# Patient Record
Sex: FEMALE | Race: WHITE | Employment: OTHER | URBAN - METROPOLITAN AREA
[De-identification: names, ages, dates, MRNs, and addresses within clinical notes are randomized per-mention and may not be internally consistent; named-entity substitution may affect disease eponyms.]

---

## 2018-03-14 NOTE — PROCEDURE NOTE: SURGICAL
<p>Prior to commencing surgery patient identification, surgical procedure, site, and side were confirmed by Dr. Yusuf Alcazar. Following topical proparacaine anesthesia, the patient was positioned at the YAG laser, a contact lens coupled to the cornea with methylcellulose and an axial posterior capsulotomy performed without complication using 3.3 Mj x 28. Excess methylcellulose was washed from the eye, one drop of Alphagan was instilled and the patient returned to the holding area having tolerated the procedure well and without complication. </p><p>MRN [id number]</p>

## 2018-03-26 NOTE — PROCEDURE NOTE: SURGICAL
<p>Prior to commencing surgery patient identification, surgical procedure, site, and side were confirmed by Dr. Umer Layton. Following topical proparacaine anesthesia, the patient was positioned at the YAG laser, a contact lens coupled to the cornea with methylcellulose and an axial posterior capsulotomy performed without complication using 3.0 Mj x 24. Excess methylcellulose was washed from the eye, one drop of Alphagan was instilled and the patient returned to the holding area having tolerated the procedure well and without complication. </p><p>MRN [id number]</p>

## 2019-11-15 ENCOUNTER — NEW REFERRAL (OUTPATIENT)
Dept: URBAN - METROPOLITAN AREA CLINIC 33 | Facility: CLINIC | Age: 84
End: 2019-11-15

## 2019-11-15 VITALS
HEIGHT: 64 IN | SYSTOLIC BLOOD PRESSURE: 129 MMHG | DIASTOLIC BLOOD PRESSURE: 83 MMHG | BODY MASS INDEX: 31.07 KG/M2 | WEIGHT: 182 LBS | HEART RATE: 73 BPM

## 2019-11-15 DIAGNOSIS — H43.813: ICD-10-CM

## 2019-11-15 DIAGNOSIS — H35.3231: ICD-10-CM

## 2019-11-15 DIAGNOSIS — H34.8322: ICD-10-CM

## 2019-11-15 DIAGNOSIS — E11.9: ICD-10-CM

## 2019-11-15 DIAGNOSIS — H04.123: ICD-10-CM

## 2019-11-15 DIAGNOSIS — H02.834: ICD-10-CM

## 2019-11-15 DIAGNOSIS — H02.831: ICD-10-CM

## 2019-11-15 DIAGNOSIS — H40.1130: ICD-10-CM

## 2019-11-15 PROCEDURE — 67028 INJECTION EYE DRUG: CPT

## 2019-11-15 PROCEDURE — 99204 OFFICE O/P NEW MOD 45 MIN: CPT

## 2019-11-15 PROCEDURE — 92250 FUNDUS PHOTOGRAPHY W/I&R: CPT

## 2019-11-15 PROCEDURE — 92134 CPTRZ OPH DX IMG PST SGM RTA: CPT

## 2019-11-15 PROCEDURE — 92235 FLUORESCEIN ANGRPH MLTIFRAME: CPT

## 2019-11-15 ASSESSMENT — VISUAL ACUITY
OS_PH: 20/30-2
OD_SC: 20/50-2
OS_CC: 20/40
OS_SC: CF 3FT

## 2019-11-15 ASSESSMENT — TONOMETRY
OS_IOP_MMHG: 14
OD_IOP_MMHG: 12

## 2019-12-20 ENCOUNTER — CLINICAL PROCEDURE AND DIAGNOSTIC TESTING ONLY (OUTPATIENT)
Dept: URBAN - METROPOLITAN AREA CLINIC 33 | Facility: CLINIC | Age: 84
End: 2019-12-20

## 2019-12-20 DIAGNOSIS — H35.3231: ICD-10-CM

## 2019-12-20 PROCEDURE — 92250 FUNDUS PHOTOGRAPHY W/I&R: CPT

## 2019-12-20 PROCEDURE — 67028 INJECTION EYE DRUG: CPT

## 2019-12-20 ASSESSMENT — VISUAL ACUITY: OD_CC: 20/50-1

## 2019-12-20 ASSESSMENT — TONOMETRY: OD_IOP_MMHG: 09

## 2020-01-24 ENCOUNTER — CLINICAL PROCEDURE AND DIAGNOSTIC TESTING ONLY (OUTPATIENT)
Dept: URBAN - METROPOLITAN AREA CLINIC 33 | Facility: CLINIC | Age: 85
End: 2020-01-24

## 2020-01-24 DIAGNOSIS — H35.3231: ICD-10-CM

## 2020-01-24 PROCEDURE — 67028 INJECTION EYE DRUG: CPT

## 2020-01-24 PROCEDURE — 92134 CPTRZ OPH DX IMG PST SGM RTA: CPT

## 2020-01-24 PROCEDURE — 92250 FUNDUS PHOTOGRAPHY W/I&R: CPT

## 2020-01-24 ASSESSMENT — VISUAL ACUITY
OS_CC: 20/30
OD_SC: 20/50-2

## 2020-01-24 ASSESSMENT — TONOMETRY
OS_IOP_MMHG: 15
OD_IOP_MMHG: 17

## 2020-02-28 ENCOUNTER — FOLLOW UP AND POST INJECTION EVALUATION (OUTPATIENT)
Dept: URBAN - METROPOLITAN AREA CLINIC 33 | Facility: CLINIC | Age: 85
End: 2020-02-28

## 2020-02-28 DIAGNOSIS — H35.3231: ICD-10-CM

## 2020-02-28 PROCEDURE — 92250 FUNDUS PHOTOGRAPHY W/I&R: CPT

## 2020-02-28 PROCEDURE — 67028 INJECTION EYE DRUG: CPT

## 2020-02-28 ASSESSMENT — TONOMETRY
OD_IOP_MMHG: 12
OS_IOP_MMHG: 11

## 2020-02-28 ASSESSMENT — VISUAL ACUITY
OD_SC: 20/50+1
OS_SC: CF 6FT

## 2020-04-17 ENCOUNTER — FOLLOW UP AND POST INJECTION EVALUATION (OUTPATIENT)
Dept: URBAN - METROPOLITAN AREA CLINIC 33 | Facility: CLINIC | Age: 85
End: 2020-04-17

## 2020-04-17 VITALS — BODY MASS INDEX: 32.44 KG/M2 | WEIGHT: 190 LBS | HEIGHT: 64 IN

## 2020-04-17 DIAGNOSIS — E11.9: ICD-10-CM

## 2020-04-17 DIAGNOSIS — H40.1130: ICD-10-CM

## 2020-04-17 DIAGNOSIS — H34.8322: ICD-10-CM

## 2020-04-17 DIAGNOSIS — H43.813: ICD-10-CM

## 2020-04-17 DIAGNOSIS — H35.3231: ICD-10-CM

## 2020-04-17 PROCEDURE — 92014 COMPRE OPH EXAM EST PT 1/>: CPT

## 2020-04-17 PROCEDURE — 67028 INJECTION EYE DRUG: CPT

## 2020-04-17 PROCEDURE — 92250 FUNDUS PHOTOGRAPHY W/I&R: CPT

## 2020-04-17 PROCEDURE — 92134 CPTRZ OPH DX IMG PST SGM RTA: CPT

## 2020-04-17 ASSESSMENT — TONOMETRY
OS_IOP_MMHG: 10
OD_IOP_MMHG: 11

## 2020-04-17 ASSESSMENT — VISUAL ACUITY
OD_SC: 20/30
OS_CC: 20/30-1

## 2020-05-29 ENCOUNTER — CLINICAL PROCEDURE AND DIAGNOSTIC TESTING ONLY (OUTPATIENT)
Dept: URBAN - METROPOLITAN AREA CLINIC 33 | Facility: CLINIC | Age: 85
End: 2020-05-29

## 2020-05-29 DIAGNOSIS — H35.3231: ICD-10-CM

## 2020-05-29 DIAGNOSIS — H34.8322: ICD-10-CM

## 2020-05-29 PROCEDURE — 67028 INJECTION EYE DRUG: CPT

## 2020-05-29 PROCEDURE — 92250 FUNDUS PHOTOGRAPHY W/I&R: CPT

## 2020-05-29 PROCEDURE — 92134 CPTRZ OPH DX IMG PST SGM RTA: CPT

## 2020-05-29 ASSESSMENT — TONOMETRY
OS_IOP_MMHG: 10
OD_IOP_MMHG: 11

## 2020-05-29 ASSESSMENT — VISUAL ACUITY
OS_SC: CF 2FT
OD_SC: 20/30-2

## 2020-07-02 ENCOUNTER — CLINICAL PROCEDURE AND DIAGNOSTIC TESTING ONLY (OUTPATIENT)
Dept: URBAN - METROPOLITAN AREA CLINIC 33 | Facility: CLINIC | Age: 85
End: 2020-07-02

## 2020-07-02 DIAGNOSIS — H35.3231: ICD-10-CM

## 2020-07-02 DIAGNOSIS — H34.8322: ICD-10-CM

## 2020-07-02 PROCEDURE — J0178* EYLEA

## 2020-07-02 PROCEDURE — 67028 INJECTION EYE DRUG: CPT

## 2020-07-02 PROCEDURE — 92250 FUNDUS PHOTOGRAPHY W/I&R: CPT

## 2020-07-02 PROCEDURE — 92134 CPTRZ OPH DX IMG PST SGM RTA: CPT

## 2020-07-02 ASSESSMENT — TONOMETRY
OS_IOP_MMHG: 10
OD_IOP_MMHG: 08

## 2020-07-02 ASSESSMENT — VISUAL ACUITY
OD_SC: 20/40+2
OS_SC: CF 2FT

## 2020-08-07 ENCOUNTER — CLINICAL PROCEDURE AND DIAGNOSTIC TESTING ONLY (OUTPATIENT)
Dept: URBAN - METROPOLITAN AREA CLINIC 33 | Facility: CLINIC | Age: 85
End: 2020-08-07

## 2020-08-07 DIAGNOSIS — H35.3231: ICD-10-CM

## 2020-08-07 PROCEDURE — 92134 CPTRZ OPH DX IMG PST SGM RTA: CPT

## 2020-08-07 PROCEDURE — 67028 INJECTION EYE DRUG: CPT

## 2020-08-07 PROCEDURE — J0178* EYLEA

## 2020-08-07 PROCEDURE — 92250 FUNDUS PHOTOGRAPHY W/I&R: CPT

## 2020-08-07 ASSESSMENT — VISUAL ACUITY
OD_SC: 20/30
OS_SC: 20/400-1

## 2020-08-07 ASSESSMENT — TONOMETRY
OD_IOP_MMHG: 13
OS_IOP_MMHG: 12

## 2020-09-11 ENCOUNTER — FOLLOW UP AND POST INJECTION EVALUATION (OUTPATIENT)
Dept: URBAN - METROPOLITAN AREA CLINIC 33 | Facility: CLINIC | Age: 85
End: 2020-09-11

## 2020-09-11 DIAGNOSIS — H02.834: ICD-10-CM

## 2020-09-11 DIAGNOSIS — H34.8322: ICD-10-CM

## 2020-09-11 DIAGNOSIS — H35.3231: ICD-10-CM

## 2020-09-11 DIAGNOSIS — H02.831: ICD-10-CM

## 2020-09-11 DIAGNOSIS — H04.123: ICD-10-CM

## 2020-09-11 DIAGNOSIS — H43.813: ICD-10-CM

## 2020-09-11 DIAGNOSIS — H40.1130: ICD-10-CM

## 2020-09-11 DIAGNOSIS — E11.9: ICD-10-CM

## 2020-09-11 PROCEDURE — 92250 FUNDUS PHOTOGRAPHY W/I&R: CPT

## 2020-09-11 PROCEDURE — J0178* EYLEA

## 2020-09-11 PROCEDURE — 92014 COMPRE OPH EXAM EST PT 1/>: CPT

## 2020-09-11 PROCEDURE — 92134 CPTRZ OPH DX IMG PST SGM RTA: CPT

## 2020-09-11 PROCEDURE — 67028 INJECTION EYE DRUG: CPT

## 2020-09-11 ASSESSMENT — VISUAL ACUITY
OS_SC: CF 2FT
OD_SC: 20/30-1

## 2020-09-11 ASSESSMENT — TONOMETRY
OS_IOP_MMHG: 09
OD_IOP_MMHG: 10

## 2020-10-23 ENCOUNTER — CLINICAL PROCEDURE AND DIAGNOSTIC TESTING ONLY (OUTPATIENT)
Dept: URBAN - METROPOLITAN AREA CLINIC 33 | Facility: CLINIC | Age: 85
End: 2020-10-23

## 2020-10-23 DIAGNOSIS — H35.3231: ICD-10-CM

## 2020-10-23 PROCEDURE — 92134 CPTRZ OPH DX IMG PST SGM RTA: CPT

## 2020-10-23 PROCEDURE — 92250 FUNDUS PHOTOGRAPHY W/I&R: CPT

## 2020-10-23 PROCEDURE — J0178* EYLEA

## 2020-10-23 PROCEDURE — 67028 INJECTION EYE DRUG: CPT

## 2020-10-23 ASSESSMENT — TONOMETRY
OD_IOP_MMHG: 11
OS_IOP_MMHG: 12

## 2020-10-23 ASSESSMENT — VISUAL ACUITY
OS_SC: CF 2FT
OD_SC: 20/30+2

## 2020-12-04 ENCOUNTER — CLINICAL PROCEDURE AND DIAGNOSTIC TESTING ONLY (OUTPATIENT)
Dept: URBAN - METROPOLITAN AREA CLINIC 33 | Facility: CLINIC | Age: 85
End: 2020-12-04

## 2020-12-04 DIAGNOSIS — H35.3231: ICD-10-CM

## 2020-12-04 PROCEDURE — J0178* EYLEA

## 2020-12-04 PROCEDURE — 92134 CPTRZ OPH DX IMG PST SGM RTA: CPT

## 2020-12-04 PROCEDURE — 92250 FUNDUS PHOTOGRAPHY W/I&R: CPT

## 2020-12-04 PROCEDURE — 67028 INJECTION EYE DRUG: CPT

## 2020-12-04 ASSESSMENT — VISUAL ACUITY
OD_CC: 20/30+2
OS_CC: 20/40-2

## 2020-12-04 ASSESSMENT — TONOMETRY
OS_IOP_MMHG: 17
OD_IOP_MMHG: 13

## 2021-01-08 ENCOUNTER — CLINICAL PROCEDURE AND DIAGNOSTIC TESTING ONLY (OUTPATIENT)
Dept: URBAN - METROPOLITAN AREA CLINIC 33 | Facility: CLINIC | Age: 86
End: 2021-01-08

## 2021-01-08 DIAGNOSIS — H35.3231: ICD-10-CM

## 2021-01-08 PROCEDURE — 92250 FUNDUS PHOTOGRAPHY W/I&R: CPT

## 2021-01-08 PROCEDURE — 67028 INJECTION EYE DRUG: CPT

## 2021-01-08 PROCEDURE — J0178* EYLEA

## 2021-01-08 ASSESSMENT — TONOMETRY
OS_IOP_MMHG: 15
OD_IOP_MMHG: 14

## 2021-01-08 ASSESSMENT — VISUAL ACUITY
OD_SC: 20/25-2
OS_SC: 20/400

## 2021-02-12 ENCOUNTER — CLINICAL PROCEDURE AND DIAGNOSTIC TESTING ONLY (OUTPATIENT)
Dept: URBAN - METROPOLITAN AREA CLINIC 33 | Facility: CLINIC | Age: 86
End: 2021-02-12

## 2021-02-12 DIAGNOSIS — H35.3231: ICD-10-CM

## 2021-02-12 DIAGNOSIS — H34.8322: ICD-10-CM

## 2021-02-12 PROCEDURE — 67028 INJECTION EYE DRUG: CPT

## 2021-02-12 PROCEDURE — 92250 FUNDUS PHOTOGRAPHY W/I&R: CPT

## 2021-02-12 PROCEDURE — J0178* EYLEA

## 2021-02-12 PROCEDURE — 92134 CPTRZ OPH DX IMG PST SGM RTA: CPT

## 2021-02-12 ASSESSMENT — TONOMETRY
OS_IOP_MMHG: 14
OD_IOP_MMHG: 12

## 2021-02-12 ASSESSMENT — VISUAL ACUITY
OS_SC: 20/400+2
OD_SC: 20/30-2

## 2021-03-19 ENCOUNTER — FOLLOW UP AND POST INJECTION EVALUATION (OUTPATIENT)
Dept: URBAN - METROPOLITAN AREA CLINIC 33 | Facility: CLINIC | Age: 86
End: 2021-03-19

## 2021-03-19 VITALS — BODY MASS INDEX: 34.15 KG/M2 | WEIGHT: 200 LBS | HEIGHT: 64 IN

## 2021-03-19 DIAGNOSIS — H40.1130: ICD-10-CM

## 2021-03-19 DIAGNOSIS — E11.9: ICD-10-CM

## 2021-03-19 DIAGNOSIS — H35.3231: ICD-10-CM

## 2021-03-19 DIAGNOSIS — H34.8322: ICD-10-CM

## 2021-03-19 DIAGNOSIS — H43.813: ICD-10-CM

## 2021-03-19 PROCEDURE — J0178* EYLEA

## 2021-03-19 PROCEDURE — 67028 INJECTION EYE DRUG: CPT

## 2021-03-19 PROCEDURE — 92134 CPTRZ OPH DX IMG PST SGM RTA: CPT

## 2021-03-19 PROCEDURE — 92014 COMPRE OPH EXAM EST PT 1/>: CPT

## 2021-03-19 PROCEDURE — 92250 FUNDUS PHOTOGRAPHY W/I&R: CPT

## 2021-03-19 ASSESSMENT — VISUAL ACUITY
OD_SC: 20/30-2
OS_SC: 20/400

## 2021-03-19 ASSESSMENT — TONOMETRY
OS_IOP_MMHG: 15
OD_IOP_MMHG: 13

## 2021-04-23 ENCOUNTER — CLINICAL PROCEDURE AND DIAGNOSTIC TESTING ONLY (OUTPATIENT)
Dept: URBAN - METROPOLITAN AREA CLINIC 33 | Facility: CLINIC | Age: 86
End: 2021-04-23

## 2021-04-23 DIAGNOSIS — H34.8322: ICD-10-CM

## 2021-04-23 DIAGNOSIS — H35.3231: ICD-10-CM

## 2021-04-23 PROCEDURE — 92134 CPTRZ OPH DX IMG PST SGM RTA: CPT

## 2021-04-23 PROCEDURE — 92250 FUNDUS PHOTOGRAPHY W/I&R: CPT

## 2021-04-23 PROCEDURE — 67028 INJECTION EYE DRUG: CPT

## 2021-04-23 PROCEDURE — J0178* EYLEA

## 2021-04-23 ASSESSMENT — VISUAL ACUITY
OS_SC: 20/400
OD_SC: 20/25+1

## 2021-04-23 ASSESSMENT — TONOMETRY
OD_IOP_MMHG: 14
OS_IOP_MMHG: 14

## 2021-05-28 ENCOUNTER — CLINICAL PROCEDURE AND DIAGNOSTIC TESTING ONLY (OUTPATIENT)
Dept: URBAN - METROPOLITAN AREA CLINIC 33 | Facility: CLINIC | Age: 86
End: 2021-05-28

## 2021-05-28 VITALS — HEIGHT: 55 IN

## 2021-05-28 DIAGNOSIS — H35.3231: ICD-10-CM

## 2021-05-28 PROCEDURE — 92134 CPTRZ OPH DX IMG PST SGM RTA: CPT

## 2021-05-28 PROCEDURE — 67028 INJECTION EYE DRUG: CPT

## 2021-05-28 PROCEDURE — 92250 FUNDUS PHOTOGRAPHY W/I&R: CPT

## 2021-05-28 ASSESSMENT — VISUAL ACUITY
OD_SC: 20/25-2
OS_SC: 20/400-1

## 2021-05-28 ASSESSMENT — TONOMETRY
OS_IOP_MMHG: 14
OD_IOP_MMHG: 15

## 2021-09-24 ENCOUNTER — FOLLOW UP (OUTPATIENT)
Dept: URBAN - METROPOLITAN AREA CLINIC 33 | Facility: CLINIC | Age: 86
End: 2021-09-24

## 2021-09-24 DIAGNOSIS — H04.123: ICD-10-CM

## 2021-09-24 DIAGNOSIS — H35.3211: ICD-10-CM

## 2021-09-24 DIAGNOSIS — H35.3221: ICD-10-CM

## 2021-09-24 DIAGNOSIS — H34.8322: ICD-10-CM

## 2021-09-24 DIAGNOSIS — H40.1130: ICD-10-CM

## 2021-09-24 DIAGNOSIS — H43.813: ICD-10-CM

## 2021-09-24 DIAGNOSIS — E11.9: ICD-10-CM

## 2021-09-24 PROCEDURE — 92014 COMPRE OPH EXAM EST PT 1/>: CPT

## 2021-09-24 PROCEDURE — J017850 AFLIBERCEPT (EYLEA) 1MG BILATERAL

## 2021-09-24 PROCEDURE — 92134 CPTRZ OPH DX IMG PST SGM RTA: CPT

## 2021-09-24 PROCEDURE — 92250 FUNDUS PHOTOGRAPHY W/I&R: CPT

## 2021-09-24 PROCEDURE — 67028 INJECTION EYE DRUG: CPT

## 2021-09-24 ASSESSMENT — TONOMETRY
OD_IOP_MMHG: 15
OS_IOP_MMHG: 16

## 2021-09-24 ASSESSMENT — VISUAL ACUITY
OS_SC: 20/200
OD_SC: 20/25+2
OS_PH: 20/60-2

## 2021-10-29 ENCOUNTER — CLINICAL PROCEDURE AND DIAGNOSTIC TESTING ONLY (OUTPATIENT)
Dept: URBAN - METROPOLITAN AREA CLINIC 33 | Facility: CLINIC | Age: 86
End: 2021-10-29

## 2021-10-29 DIAGNOSIS — H35.3211: ICD-10-CM

## 2021-10-29 DIAGNOSIS — H35.3221: ICD-10-CM

## 2021-10-29 PROCEDURE — 92134 CPTRZ OPH DX IMG PST SGM RTA: CPT

## 2021-10-29 PROCEDURE — J017850 AFLIBERCEPT (EYLEA) 1MG BILATERAL

## 2021-10-29 PROCEDURE — 92250 FUNDUS PHOTOGRAPHY W/I&R: CPT

## 2021-10-29 PROCEDURE — 67028 INJECTION EYE DRUG: CPT

## 2021-10-29 ASSESSMENT — TONOMETRY
OS_IOP_MMHG: 15
OD_IOP_MMHG: 13

## 2021-10-29 ASSESSMENT — VISUAL ACUITY
OS_CC: 20/80-2
OS_SC: 20/200-2
OD_SC: 20/25-1

## 2021-12-13 ENCOUNTER — CLINIC PROCEDURE ONLY (OUTPATIENT)
Dept: URBAN - METROPOLITAN AREA CLINIC 26 | Facility: CLINIC | Age: 86
End: 2021-12-13

## 2021-12-13 DIAGNOSIS — H35.3211: ICD-10-CM

## 2021-12-13 DIAGNOSIS — H35.3221: ICD-10-CM

## 2021-12-13 PROCEDURE — 67028 INJECTION EYE DRUG: CPT

## 2021-12-13 PROCEDURE — 92250 FUNDUS PHOTOGRAPHY W/I&R: CPT

## 2021-12-13 PROCEDURE — 92134 CPTRZ OPH DX IMG PST SGM RTA: CPT

## 2021-12-13 PROCEDURE — J017850 AFLIBERCEPT (EYLEA) 1MG BILATERAL

## 2021-12-13 ASSESSMENT — TONOMETRY
OS_IOP_MMHG: 19
OD_IOP_MMHG: 15

## 2021-12-13 ASSESSMENT — VISUAL ACUITY
OD_SC: 20/30-2
OS_SC: 20/400

## 2022-01-20 ENCOUNTER — CLINIC PROCEDURE ONLY (OUTPATIENT)
Dept: URBAN - METROPOLITAN AREA CLINIC 33 | Facility: CLINIC | Age: 87
End: 2022-01-20

## 2022-01-20 DIAGNOSIS — H35.3211: ICD-10-CM

## 2022-01-20 DIAGNOSIS — H35.3221: ICD-10-CM

## 2022-01-20 PROCEDURE — 92250 FUNDUS PHOTOGRAPHY W/I&R: CPT

## 2022-01-20 PROCEDURE — 92134 CPTRZ OPH DX IMG PST SGM RTA: CPT

## 2022-01-20 PROCEDURE — J017850 AFLIBERCEPT (EYLEA) 1MG BILATERAL

## 2022-01-20 PROCEDURE — 67028 INJECTION EYE DRUG: CPT

## 2022-01-20 ASSESSMENT — TONOMETRY
OS_IOP_MMHG: 13
OD_IOP_MMHG: 13

## 2022-01-20 ASSESSMENT — VISUAL ACUITY
OS_SC: 20/400
OD_SC: 20/40+1

## 2022-03-07 ENCOUNTER — CLINIC PROCEDURE ONLY (OUTPATIENT)
Dept: URBAN - METROPOLITAN AREA CLINIC 26 | Facility: CLINIC | Age: 87
End: 2022-03-07

## 2022-03-07 DIAGNOSIS — H35.3221: ICD-10-CM

## 2022-03-07 DIAGNOSIS — H35.3211: ICD-10-CM

## 2022-03-07 PROCEDURE — 92134 CPTRZ OPH DX IMG PST SGM RTA: CPT

## 2022-03-07 PROCEDURE — 67028 INJECTION EYE DRUG: CPT

## 2022-03-07 PROCEDURE — 92250 FUNDUS PHOTOGRAPHY W/I&R: CPT

## 2022-03-07 ASSESSMENT — TONOMETRY
OS_IOP_MMHG: 13
OD_IOP_MMHG: 14

## 2022-04-21 ENCOUNTER — CLINIC PROCEDURE ONLY (OUTPATIENT)
Dept: URBAN - METROPOLITAN AREA CLINIC 33 | Facility: CLINIC | Age: 87
End: 2022-04-21

## 2022-04-21 DIAGNOSIS — H35.3231: ICD-10-CM

## 2022-04-21 PROCEDURE — J017850 AFLIBERCEPT (EYLEA) 1MG BILATERAL

## 2022-04-21 PROCEDURE — 92134 CPTRZ OPH DX IMG PST SGM RTA: CPT

## 2022-04-21 PROCEDURE — 67028 INJECTION EYE DRUG: CPT

## 2022-04-21 PROCEDURE — 92250 FUNDUS PHOTOGRAPHY W/I&R: CPT

## 2022-04-21 ASSESSMENT — TONOMETRY
OD_IOP_MMHG: 15
OS_IOP_MMHG: 14

## 2022-05-26 ENCOUNTER — CLINIC PROCEDURE ONLY (OUTPATIENT)
Dept: URBAN - METROPOLITAN AREA CLINIC 33 | Facility: CLINIC | Age: 87
End: 2022-05-26

## 2022-05-26 DIAGNOSIS — H43.813: ICD-10-CM

## 2022-05-26 DIAGNOSIS — H04.123: ICD-10-CM

## 2022-05-26 DIAGNOSIS — E11.9: ICD-10-CM

## 2022-05-26 DIAGNOSIS — H34.8322: ICD-10-CM

## 2022-05-26 DIAGNOSIS — H35.3231: ICD-10-CM

## 2022-05-26 DIAGNOSIS — H40.1130: ICD-10-CM

## 2022-05-26 PROCEDURE — 67028 INJECTION EYE DRUG: CPT

## 2022-05-26 PROCEDURE — 92250 FUNDUS PHOTOGRAPHY W/I&R: CPT

## 2022-05-26 PROCEDURE — 92134 CPTRZ OPH DX IMG PST SGM RTA: CPT

## 2022-05-26 PROCEDURE — J017850 AFLIBERCEPT (EYLEA) 1MG BILATERAL

## 2022-05-26 PROCEDURE — 92012 INTRM OPH EXAM EST PATIENT: CPT

## 2022-05-26 ASSESSMENT — TONOMETRY
OS_IOP_MMHG: 16
OD_IOP_MMHG: 20

## 2022-06-04 ENCOUNTER — TELEPHONE ENCOUNTER (OUTPATIENT)
Dept: URBAN - METROPOLITAN AREA CLINIC 68 | Facility: CLINIC | Age: 87
End: 2022-06-04

## 2022-06-04 RX ORDER — PIOGLITAZONE HCL 15 MG
TABLET ORAL
OUTPATIENT
Start: 2007-05-22 | End: 2011-11-03

## 2022-06-04 RX ORDER — UBIDECARENONE/VIT E ACET 100MG-5
COQ10( 100MG ORAL  DAILY ) INACTIVE -HX ENTRY CAPSULE ORAL DAILY
OUTPATIENT
Start: 2015-05-06

## 2022-06-04 RX ORDER — CIPROFLOXACIN HYDROCHLORIDE 500 MG/1
CIPRO( 500MG ORAL  TWICE DAILY ) INACTIVE -HX ENTRY TABLET, FILM COATED ORAL TWICE DAILY
OUTPATIENT
Start: 2019-03-11

## 2022-06-04 RX ORDER — METFORMIN HYDROCHLORIDE 1000 MG/1
METFORMIN HCL( 1000MG ORAL 1 TWICE DAILY ) INACTIVE -HX ENTRY TABLET, COATED ORAL TWICE DAILY
OUTPATIENT
Start: 2015-05-06

## 2022-06-04 RX ORDER — MULTIVITAMIN
MULTIPLE VITAMIN(  ORAL  DAILY ) INACTIVE -HX ENTRY TABLET ORAL DAILY
OUTPATIENT
Start: 2015-05-06

## 2022-06-04 RX ORDER — VALSARTAN 160 MG/1
DIOVAN( 160MG ORAL 1 DAILY ) INACTIVE -HX ENTRY TABLET ORAL DAILY
OUTPATIENT
Start: 2015-05-06

## 2022-06-04 RX ORDER — AMLODIPINE BESYLATE 5 MG/1
AMLODIPINE BESYLATE( 5MG ORAL 1 DAILY ) INACTIVE -HX ENTRY TABLET ORAL DAILY
OUTPATIENT
Start: 2015-05-06

## 2022-06-04 RX ORDER — BUPROPION HCL 150 MG
TABLET, EXTENDED RELEASE 24 HR ORAL
OUTPATIENT
Start: 2007-05-22 | End: 2011-11-03

## 2022-06-04 RX ORDER — PROPAFENONE HYDROCHLORIDE 150 MG/1
PROPAFENONE HCL( 150MG ORAL  EVERY 12 HOURS ) INACTIVE -HX ENTRY TABLET, FILM COATED ORAL EVERY 12 HOURS
OUTPATIENT
Start: 2019-03-11

## 2022-06-04 RX ORDER — LOSARTAN POTASSIUM 50 MG/1
TABLET, FILM COATED ORAL
OUTPATIENT
Start: 2007-05-22 | End: 2011-11-03

## 2022-06-04 RX ORDER — SIMVASTATIN 40 MG/1
SIMVASTATIN( 40MG ORAL 1 AT BEDTIME ) INACTIVE -HX ENTRY TABLET, FILM COATED ORAL AT BEDTIME
OUTPATIENT
Start: 2015-05-06

## 2022-06-04 RX ORDER — RIVAROXABAN 15 MG/1
XARELTO( 15MG ORAL  DAILY ) INACTIVE -HX ENTRY TABLET, FILM COATED ORAL DAILY
OUTPATIENT
Start: 2016-01-04

## 2022-06-04 RX ORDER — TRIAMTERENE AND HYDROCHLOROTHIAZIDE 37.5; 25 MG/1; MG/1
TABLET ORAL
OUTPATIENT
Start: 2007-05-22 | End: 2011-11-03

## 2022-06-04 RX ORDER — BENZONATATE 100 MG/1
BENZONATATE( 100MG ORAL  3 A DAY PRN ) INACTIVE -HX ENTRY CAPSULE ORAL
OUTPATIENT
Start: 2019-03-11

## 2022-06-04 RX ORDER — PANTOPRAZOLE SODIUM 40 MG/1
PANTOPRAZOLE SODIUM( 40MG ORAL  2 A DAY ) INACTIVE -HX ENTRY TABLET, DELAYED RELEASE ORAL
OUTPATIENT
Start: 2019-03-11

## 2022-06-04 RX ORDER — OMEPRAZOLE 20 MG/1
CAPSULE, DELAYED RELEASE ORAL
Qty: 30 | Refills: 30 | OUTPATIENT
Start: 2012-06-26 | End: 2015-05-06

## 2022-06-04 RX ORDER — PANTOPRAZOLE SODIUM 40 MG/1
TABLET, DELAYED RELEASE ORAL
Qty: 180 | Refills: 180 | OUTPATIENT
Start: 2015-05-06 | End: 2019-03-11

## 2022-06-04 RX ORDER — LEVOTHYROXINE SODIUM 0.07 MG/1
LEVOTHYROXINE SODIUM( 75MCG ORAL 1 DAILY ) INACTIVE -HX ENTRY TABLET ORAL DAILY
OUTPATIENT
Start: 2015-05-06

## 2022-06-04 RX ORDER — BIOTIN 5 MG
KRILL OIL( 1000MG ORAL  DAILY ) INACTIVE -HX ENTRY TABLET ORAL DAILY
OUTPATIENT
Start: 2015-05-06

## 2022-06-04 RX ORDER — POLYETHYLENE GLYCOL 3350, SODIUM SULFATE, SODIUM CHLORIDE, POTASSIUM CHLORIDE, ASCORBIC ACID, SODIUM ASCORBATE 7.5-2.691G
KIT ORAL AS DIRECTED
Qty: 1 | Refills: 0 | OUTPATIENT
Start: 2016-01-04 | End: 2016-03-03

## 2022-06-04 RX ORDER — PREDNISONE 10 MG/1
TABLET ORAL
Qty: 7 | Refills: 7 | OUTPATIENT
Start: 2012-05-22 | End: 2015-05-06

## 2022-06-04 RX ORDER — GLIMEPIRIDE 1 MG/1
GLIMEPIRIDE( 1MG ORAL  DAILY ) INACTIVE -HX ENTRY TABLET ORAL DAILY
OUTPATIENT
Start: 2019-03-11

## 2022-06-04 RX ORDER — EXEMESTANE 25 MG
AROMASIN( 25MG ORAL 1 DAILY ) INACTIVE -HX ENTRY TABLET ORAL DAILY
OUTPATIENT
Start: 2015-05-06

## 2022-06-04 RX ORDER — METOPROLOL TARTRATE 25 MG/1
TABLET, FILM COATED ORAL
OUTPATIENT
Start: 2007-05-22 | End: 2011-11-03

## 2022-06-04 RX ORDER — EXEMESTANE 25 MG
TABLET ORAL
OUTPATIENT
Start: 2007-05-22 | End: 2011-11-03

## 2022-06-04 RX ORDER — DIGOXIN 0.12 MG/1
DIGITEK( 0.125MG ORAL  DAILY ) INACTIVE -HX ENTRY TABLET ORAL DAILY
OUTPATIENT
Start: 2019-03-11

## 2022-06-04 RX ORDER — ASPIRIN 81 MG/1
ASPIRIN( 81MG ORAL  DAILY ) INACTIVE -HX ENTRY TABLET, CHEWABLE ORAL DAILY
OUTPATIENT
Start: 2015-05-06

## 2022-06-05 ENCOUNTER — TELEPHONE ENCOUNTER (OUTPATIENT)
Dept: URBAN - METROPOLITAN AREA CLINIC 68 | Facility: CLINIC | Age: 87
End: 2022-06-05

## 2022-06-05 RX ORDER — FUROSEMIDE 40 MG/1
FUROSEMIDE( 40MG ORAL  DAILY ) ACTIVE -HX ENTRY TABLET ORAL DAILY
Status: ACTIVE | COMMUNITY
Start: 2019-03-11

## 2022-06-05 RX ORDER — DRONEDARONE 400 MG/1
MULTAQ( 400MG ORAL 2 DAILY ) ACTIVE -HX ENTRY TABLET, FILM COATED ORAL DAILY
Status: ACTIVE | COMMUNITY
Start: 2019-03-11

## 2022-06-05 RX ORDER — OMEPRAZOLE 40 MG/1
OMEPRAZOLE( 40MG ORAL 1 DAILY ) ACTIVE -HX ENTRY CAPSULE, DELAYED RELEASE PELLETS ORAL DAILY
Status: ACTIVE | COMMUNITY
Start: 2019-03-11

## 2022-06-05 RX ORDER — METOPROLOL SUCCINATE 100 MG/1
METOPROLOL SUCCINATE ER( 100MG ORAL 1 DAILY ) ACTIVE -HX ENTRY TABLET, EXTENDED RELEASE ORAL DAILY
Status: ACTIVE | COMMUNITY
Start: 2019-03-11

## 2022-06-05 RX ORDER — WARFARIN 3 MG/1
WARFARIN SODIUM( 3MG ORAL  AS DIRECTED ) ACTIVE -HX ENTRY TABLET ORAL AS DIRECTED
Status: ACTIVE | COMMUNITY
Start: 2019-03-11

## 2022-06-05 RX ORDER — ATORVASTATIN CALCIUM 20 MG/1
ATORVASTATIN CALCIUM( 20MG ORAL  QHS ) ACTIVE -HX ENTRY TABLET, FILM COATED ORAL QHS
Status: ACTIVE | COMMUNITY
Start: 2019-03-11

## 2022-06-05 RX ORDER — MONTELUKAST SODIUM 10 MG/1
MONTELUKAST SODIUM( 10MG ORAL  DAILY ) ACTIVE -HX ENTRY TABLET, FILM COATED ORAL DAILY
Status: ACTIVE | COMMUNITY
Start: 2019-03-11

## 2022-06-05 RX ORDER — MULTIVITAMIN
DAILY VITAMINS(  ORAL  DAILY ) ACTIVE -HX ENTRY TABLET ORAL DAILY
Status: ACTIVE | COMMUNITY
Start: 2019-03-11

## 2022-06-05 RX ORDER — ASPIRIN 81 MG/1
ASPIRIN( 81MG ORAL 1 DAILY ) ACTIVE -HX ENTRY TABLET, COATED ORAL DAILY
Status: ACTIVE | COMMUNITY
Start: 2019-03-11

## 2022-06-05 RX ORDER — AMLODIPINE BESYLATE 5 MG/1
AMLODIPINE BESYLATE( 5MG ORAL 1 DAILY ) ACTIVE -HX ENTRY TABLET ORAL DAILY
Status: ACTIVE | COMMUNITY
Start: 2019-03-11

## 2022-06-05 RX ORDER — TIOTROPIUM BROMIDE 18 UG/1
SPIRIVA HANDIHALER( 18MCG INHALATION  DAILY ) ACTIVE -HX ENTRY CAPSULE ORAL; RESPIRATORY (INHALATION) DAILY
Status: ACTIVE | COMMUNITY
Start: 2019-03-11

## 2022-06-25 ENCOUNTER — TELEPHONE ENCOUNTER (OUTPATIENT)
Age: 87
End: 2022-06-25

## 2022-06-25 RX ORDER — PANTOPRAZOLE 40 MG/1
TABLET, DELAYED RELEASE ORAL
Qty: 180 | Refills: 180 | OUTPATIENT
Start: 2015-05-06 | End: 2019-03-11

## 2022-06-25 RX ORDER — OMEPRAZOLE 20 MG/1
CAPSULE, DELAYED RELEASE ORAL
Qty: 30 | Refills: 30 | OUTPATIENT
Start: 2012-06-26 | End: 2015-05-06

## 2022-06-25 RX ORDER — LOSARTAN POTASSIUM 50 MG/1
TABLET, FILM COATED ORAL
OUTPATIENT
Start: 2007-05-22 | End: 2011-11-03

## 2022-06-25 RX ORDER — VALSARTAN 160 MG/1
DIOVAN( 160MG ORAL 1 DAILY ) INACTIVE -HX ENTRY TABLET ORAL DAILY
OUTPATIENT
Start: 2015-05-06

## 2022-06-25 RX ORDER — METFORMIN HCL 1000 MG/1
METFORMIN HCL( 1000MG ORAL 1 TWICE DAILY ) INACTIVE -HX ENTRY TABLET ORAL TWICE DAILY
OUTPATIENT
Start: 2015-05-06

## 2022-06-25 RX ORDER — POLYETHYLENE GLYCOL 3350, SODIUM SULFATE, SODIUM CHLORIDE, POTASSIUM CHLORIDE, ASCORBIC ACID, SODIUM ASCORBATE 7.5-2.691G
KIT ORAL AS DIRECTED
Qty: 1 | Refills: 0 | OUTPATIENT
Start: 2016-01-04 | End: 2016-03-03

## 2022-06-25 RX ORDER — PANTOPRAZOLE 40 MG/1
PANTOPRAZOLE SODIUM( 40MG ORAL  2 A DAY ) INACTIVE -HX ENTRY TABLET, DELAYED RELEASE ORAL
OUTPATIENT
Start: 2019-03-11

## 2022-06-25 RX ORDER — TRAVOPROST 0.04 MG/ML
TRAVATAN( 0.004% OPHTHALMIC 1 LEFT EYE ) INACTIVE -HX ENTRY SOLUTION/ DROPS OPHTHALMIC
OUTPATIENT
Start: 2015-05-06

## 2022-06-25 RX ORDER — ASPIRIN 81 MG/1
LOW-DOSE ASPIRIN( 81MG ORAL  DAILY ) INACTIVE -HX ENTRY TABLET, COATED ORAL DAILY
OUTPATIENT
Start: 2019-03-11

## 2022-06-25 RX ORDER — PROPAFENONE HYDROCHLORIDE 150 MG/1
PROPAFENONE HCL( 150MG ORAL  EVERY 12 HOURS ) INACTIVE -HX ENTRY TABLET, FILM COATED ORAL EVERY 12 HOURS
OUTPATIENT
Start: 2019-03-11

## 2022-06-25 RX ORDER — ASPIRIN 81 MG/1
TABLET, DELAYED RELEASE ORAL
OUTPATIENT
Start: 2007-05-22 | End: 2011-11-03

## 2022-06-25 RX ORDER — CIPROFLOXACIN HCL 500 MG
CIPRO( 500MG ORAL  TWICE DAILY ) INACTIVE -HX ENTRY TABLET ORAL TWICE DAILY
OUTPATIENT
Start: 2019-03-11

## 2022-06-25 RX ORDER — EXEMESTANE 25 MG
AROMASIN( 25MG ORAL 1 DAILY ) INACTIVE -HX ENTRY TABLET ORAL DAILY
OUTPATIENT
Start: 2015-05-06

## 2022-06-25 RX ORDER — BENZONATATE 100 MG/1
BENZONATATE( 100MG ORAL  3 A DAY PRN ) INACTIVE -HX ENTRY CAPSULE ORAL
OUTPATIENT
Start: 2019-03-11

## 2022-06-25 RX ORDER — METOPROLOL TARTRATE 25 MG/1
TABLET, FILM COATED ORAL
OUTPATIENT
Start: 2007-05-22 | End: 2011-11-03

## 2022-06-25 RX ORDER — METOPROLOL SUCCINATE 25 MG/1
METOPROLOL SUCCINATE( 25MG ORAL 1 DAILY ) INACTIVE -HX ENTRY TABLET, EXTENDED RELEASE ORAL DAILY
OUTPATIENT
Start: 2015-05-06

## 2022-06-25 RX ORDER — PIOGLITAZONE HCL 15 MG
TABLET ORAL
OUTPATIENT
Start: 2007-05-22 | End: 2011-11-03

## 2022-06-25 RX ORDER — SIMVASTATIN 40 MG/1
SIMVASTATIN( 40MG ORAL 1 AT BEDTIME ) INACTIVE -HX ENTRY TABLET, FILM COATED ORAL AT BEDTIME
OUTPATIENT
Start: 2015-05-06

## 2022-06-25 RX ORDER — UBIDECARENONE/VIT E ACET 100MG-5
COQ10( 100MG ORAL  DAILY ) INACTIVE -HX ENTRY CAPSULE ORAL DAILY
OUTPATIENT
Start: 2015-05-06

## 2022-06-25 RX ORDER — BUPROPION HCL 150 MG
TABLET, EXTENDED RELEASE 24 HR ORAL
OUTPATIENT
Start: 2007-05-22 | End: 2011-11-03

## 2022-06-25 RX ORDER — RIVAROXABAN 15 MG/1
XARELTO( 15MG ORAL  DAILY ) INACTIVE -HX ENTRY TABLET, FILM COATED ORAL DAILY
OUTPATIENT
Start: 2016-01-04

## 2022-06-25 RX ORDER — TRIAMTERENE AND HYDROCHLOROTHIAZIDE 25; 37.5 MG/1; MG/1
TABLET ORAL
OUTPATIENT
Start: 2007-05-22 | End: 2011-11-03

## 2022-06-25 RX ORDER — EXEMESTANE 25 MG
TABLET ORAL
OUTPATIENT
Start: 2007-05-22 | End: 2011-11-03

## 2022-06-25 RX ORDER — AMLODIPINE BESYLATE 5 MG/1
AMLODIPINE BESYLATE( 5MG ORAL 1 DAILY ) INACTIVE -HX ENTRY TABLET ORAL DAILY
OUTPATIENT
Start: 2015-05-06

## 2022-06-25 RX ORDER — B-COMPLEX WITH VITAMIN C
VITAMIN B COMPLEX(  ORAL  DAILY ) INACTIVE -HX ENTRY TABLET ORAL DAILY
OUTPATIENT
Start: 2012-05-22

## 2022-06-25 RX ORDER — ASPIRIN 81 MG/1
ASPIRIN( 81MG ORAL  DAILY ) INACTIVE -HX ENTRY TABLET, CHEWABLE ORAL DAILY
OUTPATIENT
Start: 2015-05-06

## 2022-06-25 RX ORDER — PREDNISONE 10 MG/1
TABLET ORAL
Qty: 7 | Refills: 7 | OUTPATIENT
Start: 2012-05-22 | End: 2015-05-06

## 2022-06-25 RX ORDER — CHLORHEXIDINE GLUCONATE 4 %
VITAMIN B-12( 1000MCG ORAL  DAILY ) INACTIVE -HX ENTRY LIQUID (ML) TOPICAL DAILY
OUTPATIENT
Start: 2015-05-06

## 2022-06-25 RX ORDER — TRAVOPROST 0.04 MG/ML
TRAVATAN( 0.004% OPHTHALMIC  EVENING ) INACTIVE -HX ENTRY SOLUTION/ DROPS OPHTHALMIC
OUTPATIENT
Start: 2019-03-11

## 2022-06-25 RX ORDER — GLIMEPIRIDE 1 MG/1
GLIMEPIRIDE( 1MG ORAL  DAILY ) INACTIVE -HX ENTRY TABLET ORAL DAILY
OUTPATIENT
Start: 2019-03-11

## 2022-06-25 RX ORDER — LEVOTHYROXINE SODIUM 0.07 MG/1
LEVOTHYROXINE SODIUM( 75MCG ORAL 1 DAILY ) INACTIVE -HX ENTRY TABLET ORAL DAILY
OUTPATIENT
Start: 2015-05-06

## 2022-06-26 ENCOUNTER — TELEPHONE ENCOUNTER (OUTPATIENT)
Age: 87
End: 2022-06-26

## 2022-06-26 RX ORDER — MONTELUKAST 10 MG/1
MONTELUKAST SODIUM( 10MG ORAL  DAILY ) ACTIVE -HX ENTRY TABLET, FILM COATED ORAL DAILY
Status: ACTIVE | COMMUNITY
Start: 2019-03-11

## 2022-06-26 RX ORDER — WARFARIN 3 MG/1
WARFARIN SODIUM( 3MG ORAL  AS DIRECTED ) ACTIVE -HX ENTRY TABLET ORAL AS DIRECTED
Status: ACTIVE | COMMUNITY
Start: 2019-03-11

## 2022-06-26 RX ORDER — METOPROLOL SUCCINATE 100 MG/1
METOPROLOL SUCCINATE ER( 100MG ORAL 1 DAILY ) ACTIVE -HX ENTRY TABLET, EXTENDED RELEASE ORAL DAILY
Status: ACTIVE | COMMUNITY
Start: 2019-03-11

## 2022-06-26 RX ORDER — OMEPRAZOLE 40 MG/1
OMEPRAZOLE( 40MG ORAL 1 DAILY ) ACTIVE -HX ENTRY CAPSULE, DELAYED RELEASE ORAL DAILY
Status: ACTIVE | COMMUNITY
Start: 2019-03-11

## 2022-06-26 RX ORDER — FUROSEMIDE 40 MG/1
FUROSEMIDE( 40MG ORAL  DAILY ) ACTIVE -HX ENTRY TABLET ORAL DAILY
Status: ACTIVE | COMMUNITY
Start: 2019-03-11

## 2022-06-26 RX ORDER — TIOTROPIUM BROMIDE 18 UG/1
SPIRIVA HANDIHALER( 18MCG INHALATION  DAILY ) ACTIVE -HX ENTRY CAPSULE ORAL; RESPIRATORY (INHALATION) DAILY
Status: ACTIVE | COMMUNITY
Start: 2019-03-11

## 2022-06-26 RX ORDER — DRONEDARONE 400 MG/1
MULTAQ( 400MG ORAL 2 DAILY ) ACTIVE -HX ENTRY TABLET, FILM COATED ORAL DAILY
Status: ACTIVE | COMMUNITY
Start: 2019-03-11

## 2022-06-26 RX ORDER — MULTIVITAMIN
DAILY VITAMINS(  ORAL  DAILY ) ACTIVE -HX ENTRY TABLET ORAL DAILY
Status: ACTIVE | COMMUNITY
Start: 2019-03-11

## 2022-06-26 RX ORDER — ATORVASTATIN CALCIUM 20 MG/1
ATORVASTATIN CALCIUM( 20MG ORAL  QHS ) ACTIVE -HX ENTRY TABLET, FILM COATED ORAL QHS
Status: ACTIVE | COMMUNITY
Start: 2019-03-11

## 2022-06-26 RX ORDER — FLUTICASONE PROPIONATE 50 UG/1
FLONASE( 50MCG/ACT NASAL  AS NEEDED ) ACTIVE -HX ENTRY SPRAY, METERED NASAL AS NEEDED
Status: ACTIVE | COMMUNITY
Start: 2019-03-11

## 2022-06-26 RX ORDER — ASPIRIN 81 MG/1
ASPIRIN( 81MG ORAL 1 DAILY ) ACTIVE -HX ENTRY TABLET, COATED ORAL DAILY
Status: ACTIVE | COMMUNITY
Start: 2019-03-11

## 2022-06-26 RX ORDER — NITROGLYCERIN 0.4 MG/1
NITROQUICK( 0.4MG SUBLINGUAL  PRN ) ACTIVE -HX ENTRY TABLET SUBLINGUAL PRN
Status: ACTIVE | COMMUNITY
Start: 2019-03-11

## 2022-06-26 RX ORDER — AMLODIPINE BESYLATE 5 MG/1
AMLODIPINE BESYLATE( 5MG ORAL 1 DAILY ) ACTIVE -HX ENTRY TABLET ORAL DAILY
Status: ACTIVE | COMMUNITY
Start: 2019-03-11

## 2022-09-22 ENCOUNTER — FOLLOW UP (OUTPATIENT)
Dept: URBAN - METROPOLITAN AREA CLINIC 33 | Facility: CLINIC | Age: 87
End: 2022-09-22

## 2022-09-22 DIAGNOSIS — E11.9: ICD-10-CM

## 2022-09-22 DIAGNOSIS — H40.1130: ICD-10-CM

## 2022-09-22 DIAGNOSIS — H34.8322: ICD-10-CM

## 2022-09-22 DIAGNOSIS — H35.3231: ICD-10-CM

## 2022-09-22 DIAGNOSIS — H04.123: ICD-10-CM

## 2022-09-22 DIAGNOSIS — H43.813: ICD-10-CM

## 2022-09-22 PROCEDURE — 67028 INJECTION EYE DRUG: CPT

## 2022-09-22 PROCEDURE — 92134 CPTRZ OPH DX IMG PST SGM RTA: CPT

## 2022-09-22 PROCEDURE — 92014 COMPRE OPH EXAM EST PT 1/>: CPT

## 2022-09-22 PROCEDURE — 92250 FUNDUS PHOTOGRAPHY W/I&R: CPT

## 2022-09-22 PROCEDURE — J017850 AFLIBERCEPT (EYLEA) 1MG BILATERAL

## 2022-09-22 ASSESSMENT — TONOMETRY
OS_IOP_MMHG: 13
OD_IOP_MMHG: 13

## 2022-09-22 ASSESSMENT — VISUAL ACUITY
OS_SC: 20/400-1
OD_SC: 20/25-1
OS_PH: 20/60-1

## 2022-10-27 ENCOUNTER — CLINIC PROCEDURE ONLY (OUTPATIENT)
Dept: URBAN - METROPOLITAN AREA CLINIC 33 | Facility: CLINIC | Age: 87
End: 2022-10-27

## 2022-10-27 DIAGNOSIS — H35.3231: ICD-10-CM

## 2022-10-27 PROCEDURE — 92134 CPTRZ OPH DX IMG PST SGM RTA: CPT

## 2022-10-27 PROCEDURE — J017850 AFLIBERCEPT (EYLEA) 1MG BILATERAL

## 2022-10-27 PROCEDURE — 92250 FUNDUS PHOTOGRAPHY W/I&R: CPT

## 2022-10-27 PROCEDURE — 6702850 BILATERAL INTRAVITREAL INJECTION

## 2022-10-27 ASSESSMENT — TONOMETRY
OD_IOP_MMHG: 13
OS_IOP_MMHG: 15

## 2022-12-08 ENCOUNTER — CLINIC PROCEDURE ONLY (OUTPATIENT)
Dept: URBAN - METROPOLITAN AREA CLINIC 33 | Facility: CLINIC | Age: 87
End: 2022-12-08

## 2022-12-08 PROCEDURE — 92250 FUNDUS PHOTOGRAPHY W/I&R: CPT

## 2022-12-08 PROCEDURE — 92134 CPTRZ OPH DX IMG PST SGM RTA: CPT

## 2022-12-08 PROCEDURE — J017850 AFLIBERCEPT (EYLEA) 1MG BILATERAL

## 2022-12-08 PROCEDURE — 6702850 BILATERAL INTRAVITREAL INJECTION

## 2022-12-08 ASSESSMENT — TONOMETRY
OS_IOP_MMHG: 14
OD_IOP_MMHG: 13

## 2022-12-22 NOTE — PATIENT DISCUSSION
Conjunctivitis precautions explained to prevent risk of spread. Humira Counseling:  I discussed with the patient the risks of adalimumab including but not limited to myelosuppression, immunosuppression, autoimmune hepatitis, demyelinating diseases, lymphoma, and serious infections.  The patient understands that monitoring is required including a PPD at baseline and must alert us or the primary physician if symptoms of infection or other concerning signs are noted.

## 2023-01-19 ENCOUNTER — CLINIC PROCEDURE ONLY (OUTPATIENT)
Dept: URBAN - METROPOLITAN AREA CLINIC 33 | Facility: CLINIC | Age: 88
End: 2023-01-19

## 2023-01-19 DIAGNOSIS — H35.3231: ICD-10-CM

## 2023-01-19 PROCEDURE — 6702850 BILATERAL INTRAVITREAL INJECTION

## 2023-01-19 PROCEDURE — J017850 AFLIBERCEPT (EYLEA) 1MG BILATERAL

## 2023-01-19 PROCEDURE — 92250 FUNDUS PHOTOGRAPHY W/I&R: CPT

## 2023-01-19 PROCEDURE — 92134 CPTRZ OPH DX IMG PST SGM RTA: CPT

## 2023-01-19 ASSESSMENT — TONOMETRY
OD_IOP_MMHG: 17
OS_IOP_MMHG: 16

## 2025-05-06 ENCOUNTER — DASHBOARD ENCOUNTERS (OUTPATIENT)
Age: OVER 89
End: 2025-05-06

## 2025-05-06 ENCOUNTER — OFFICE VISIT (OUTPATIENT)
Dept: URBAN - METROPOLITAN AREA CLINIC 68 | Facility: CLINIC | Age: OVER 89
End: 2025-05-06
Payer: COMMERCIAL

## 2025-05-06 DIAGNOSIS — K52.9 CHRONIC DIARRHEA: ICD-10-CM

## 2025-05-06 PROBLEM — 236071009: Status: ACTIVE | Noted: 2025-05-06

## 2025-05-06 PROCEDURE — 99204 OFFICE O/P NEW MOD 45 MIN: CPT | Performed by: INTERNAL MEDICINE

## 2025-05-06 RX ORDER — FUROSEMIDE 40 MG/1
FUROSEMIDE( 40MG ORAL  DAILY ) ACTIVE -HX ENTRY TABLET ORAL DAILY
Status: ACTIVE | COMMUNITY
Start: 2019-03-11

## 2025-05-06 RX ORDER — MULTIVITAMIN
DAILY VITAMINS(  ORAL  DAILY ) ACTIVE -HX ENTRY TABLET ORAL DAILY
Status: ACTIVE | COMMUNITY
Start: 2019-03-11

## 2025-05-06 RX ORDER — NITROGLYCERIN 0.4 MG/1
NITROQUICK( 0.4MG SUBLINGUAL  PRN ) ACTIVE -HX ENTRY TABLET SUBLINGUAL PRN
Status: ACTIVE | COMMUNITY
Start: 2019-03-11

## 2025-05-06 RX ORDER — MONTELUKAST 10 MG/1
MONTELUKAST SODIUM( 10MG ORAL  DAILY ) ACTIVE -HX ENTRY TABLET, FILM COATED ORAL DAILY
Status: ACTIVE | COMMUNITY
Start: 2019-03-11

## 2025-05-06 RX ORDER — FLUTICASONE PROPIONATE 50 UG/1
FLONASE( 50MCG/ACT NASAL  AS NEEDED ) ACTIVE -HX ENTRY SPRAY, METERED NASAL AS NEEDED
Status: ACTIVE | COMMUNITY
Start: 2019-03-11

## 2025-05-06 RX ORDER — METOPROLOL SUCCINATE 100 MG/1
METOPROLOL SUCCINATE ER( 100MG ORAL 1 DAILY ) ACTIVE -HX ENTRY TABLET, EXTENDED RELEASE ORAL DAILY
Status: ACTIVE | COMMUNITY
Start: 2019-03-11

## 2025-05-06 RX ORDER — ATORVASTATIN CALCIUM 20 MG/1
ATORVASTATIN CALCIUM( 20MG ORAL  QHS ) ACTIVE -HX ENTRY TABLET, FILM COATED ORAL QHS
Status: ACTIVE | COMMUNITY
Start: 2019-03-11

## 2025-05-06 RX ORDER — LEVOTHYROXINE SODIUM 0.07 MG/1
TABLET ORAL
Qty: 30 TABLET | Status: ACTIVE | COMMUNITY

## 2025-05-06 RX ORDER — PANTOPRAZOLE SODIUM 40 MG/1
TABLET, DELAYED RELEASE ORAL
Qty: 30 TABLET | Status: ACTIVE | COMMUNITY

## 2025-05-06 RX ORDER — ENOXAPARIN SODIUM 100 MG/ML
INJECTION SUBCUTANEOUS
Qty: 8 MILLILITER | Refills: 0 | Status: ACTIVE | COMMUNITY

## 2025-05-06 RX ORDER — DULOXETINE 60 MG/1
CAPSULE, DELAYED RELEASE ORAL
Qty: 90 CAPSULE | Status: ACTIVE | COMMUNITY

## 2025-05-06 RX ORDER — WARFARIN 3 MG/1
WARFARIN SODIUM( 3MG ORAL  AS DIRECTED ) ACTIVE -HX ENTRY TABLET ORAL AS DIRECTED
Status: ACTIVE | COMMUNITY
Start: 2019-03-11

## 2025-05-06 RX ORDER — ISOSORBIDE MONONITRATE 30 MG/1
TABLET, EXTENDED RELEASE ORAL
Qty: 30 TABLET | Status: ACTIVE | COMMUNITY

## 2025-05-06 RX ORDER — DRONEDARONE 400 MG/1
MULTAQ( 400MG ORAL 2 DAILY ) ACTIVE -HX ENTRY TABLET, FILM COATED ORAL DAILY
Status: ACTIVE | COMMUNITY
Start: 2019-03-11

## 2025-05-06 RX ORDER — TIOTROPIUM BROMIDE 18 UG/1
SPIRIVA HANDIHALER( 18MCG INHALATION  DAILY ) ACTIVE -HX ENTRY CAPSULE ORAL; RESPIRATORY (INHALATION) DAILY
Status: ACTIVE | COMMUNITY
Start: 2019-03-11

## 2025-05-06 RX ORDER — OMEPRAZOLE 40 MG/1
OMEPRAZOLE( 40MG ORAL 1 DAILY ) ACTIVE -HX ENTRY CAPSULE, DELAYED RELEASE ORAL DAILY
Status: ACTIVE | COMMUNITY
Start: 2019-03-11

## 2025-05-06 RX ORDER — ASPIRIN 81 MG/1
ASPIRIN( 81MG ORAL 1 DAILY ) ACTIVE -HX ENTRY TABLET, COATED ORAL DAILY
Status: ACTIVE | COMMUNITY
Start: 2019-03-11

## 2025-05-06 NOTE — HPI-TODAY'S VISIT:
Case of a 89-year-old female patient that comes in today for evaluation.  Patient refers that for months now she has been having episodes of watery nonbloody diarrhea.  This is a longstanding symptom.  She refers that the check for blood in the stools and this was reported as positive.  She denies abdominal pain.  Denies nausea or vomiting.  Denies weight loss.

## 2025-05-15 PROBLEM — 24789006: Status: ACTIVE | Noted: 2025-05-15
